# Patient Record
Sex: FEMALE | Race: WHITE | NOT HISPANIC OR LATINO | URBAN - METROPOLITAN AREA
[De-identification: names, ages, dates, MRNs, and addresses within clinical notes are randomized per-mention and may not be internally consistent; named-entity substitution may affect disease eponyms.]

---

## 2020-07-31 ENCOUNTER — OFFICE VISIT (OUTPATIENT)
Dept: URBAN - METROPOLITAN AREA CLINIC 25 | Facility: CLINIC | Age: 85
End: 2020-07-31
Payer: MEDICARE

## 2020-07-31 ENCOUNTER — DASHBOARD ENCOUNTERS (OUTPATIENT)
Age: 85
End: 2020-07-31

## 2020-07-31 DIAGNOSIS — R15.1 FECAL SMEARING: ICD-10-CM

## 2020-07-31 DIAGNOSIS — R10.30 LOWER ABDOMINAL PAIN: ICD-10-CM

## 2020-07-31 DIAGNOSIS — Z79.02 ANTIPLATELET OR ANTITHROMBOTIC LONG-TERM USE: ICD-10-CM

## 2020-07-31 DIAGNOSIS — R13.10 ESOPHAGEAL DYSPHAGIA: ICD-10-CM

## 2020-07-31 DIAGNOSIS — K62.89 ANAL SPHINCTER INCOMPETENCE: ICD-10-CM

## 2020-07-31 PROBLEM — 305058001: Status: ACTIVE | Noted: 2020-07-31

## 2020-07-31 PROBLEM — 225593006: Status: ACTIVE | Noted: 2020-07-31

## 2020-07-31 PROBLEM — 249647002: Status: ACTIVE | Noted: 2020-07-31

## 2020-07-31 PROBLEM — 40890009: Status: ACTIVE | Noted: 2020-07-31

## 2020-07-31 PROBLEM — 54586004: Status: ACTIVE | Noted: 2020-07-31

## 2020-07-31 PROCEDURE — 99213 OFFICE O/P EST LOW 20 MIN: CPT | Performed by: INTERNAL MEDICINE

## 2020-07-31 PROCEDURE — G8420 CALC BMI NORM PARAMETERS: HCPCS | Performed by: INTERNAL MEDICINE

## 2020-07-31 PROCEDURE — G8427 DOCREV CUR MEDS BY ELIG CLIN: HCPCS | Performed by: INTERNAL MEDICINE

## 2020-07-31 PROCEDURE — G9903 PT SCRN TBCO ID AS NON USER: HCPCS | Performed by: INTERNAL MEDICINE

## 2020-07-31 RX ORDER — DORZOLAMIDE HCL/PF 2 %
DROPS OPHTHALMIC (EYE)
Qty: 0 | Refills: 0 | Status: ACTIVE | COMMUNITY
Start: 1900-01-01

## 2020-07-31 RX ORDER — ASPIRIN 81 MG/1
TABLET, COATED ORAL
Qty: 0 | Refills: 0 | Status: ACTIVE | COMMUNITY
Start: 1900-01-01

## 2020-07-31 RX ORDER — FOLIC ACID 1 MG/1
TAKE 1 TABLET (1 MG) BY ORAL ROUTE ONCE DAILY TABLET ORAL 1
Qty: 0 | Refills: 0 | Status: ACTIVE | COMMUNITY
Start: 1900-01-01

## 2020-07-31 RX ORDER — LATANOPROST/PF 0.005 %
DROPS OPHTHALMIC (EYE)
Qty: 0 | Refills: 0 | Status: ACTIVE | COMMUNITY
Start: 1900-01-01

## 2020-07-31 RX ORDER — RANOLAZINE 500 MG/1
TAKE 1 TABLET (500 MG) BY ORAL ROUTE 2 TIMES PER DAY TABLET, FILM COATED, EXTENDED RELEASE ORAL 2
Qty: 0 | Refills: 0 | Status: ACTIVE | COMMUNITY
Start: 1900-01-01

## 2020-07-31 RX ORDER — METOPROLOL TARTRATE 50 MG/1
TABLET ORAL
Qty: 0 | Refills: 0 | Status: ACTIVE | COMMUNITY
Start: 1900-01-01

## 2020-07-31 RX ORDER — SERTRALINE 100 MG/1
TAKE 1 TABLET BY ORAL ROUTE 2 TIMES A DAY TABLET, FILM COATED ORAL 2
Qty: 0 | Refills: 0 | Status: ACTIVE | COMMUNITY
Start: 1900-01-01

## 2020-07-31 RX ORDER — PANTOPRAZOLE SODIUM 20 MG/1
1 TABLET TABLET, DELAYED RELEASE ORAL ONCE A DAY
Qty: 90 | Refills: 2 | OUTPATIENT
Start: 2020-07-31

## 2020-07-31 RX ORDER — CLOPIDOGREL 75 MG/1
TAKE 1 TABLET (75 MG) BY ORAL ROUTE ONCE DAILY TABLET ORAL 1
Qty: 0 | Refills: 0 | Status: ACTIVE | COMMUNITY
Start: 1900-01-01

## 2020-07-31 NOTE — HPI-TODAY'S VISIT:
July 2020 : remains on plavix. symptomatic dysphagia. no gerd. using metamucil which helps with fecal smearing.

## 2020-07-31 NOTE — HPI-OTHER HISTORIES
March 2020 -  since pt had PCI in july 2019- plavix was recommended atleast for 1 yr ( until july 2020) without interruption. fecal leakage is better since on fiber. continue to be on PPI. intermittent dysphagia continues. mostly food related.    Sept 2019 -  Remains on metamucil which has helped with fecal incontinence. Continues to have intermittent dysphagia. Mild heartburn. On protonix 40 mg daily. Patient is now willing to have EGD.   June 2019 -  Fecal incontinence significantly improved with fiber use. Abdominal pain has improved. Patient did experience intermittent dysphagia. Dysphagia mostly occurs with chicken and potatoes. No heartburn but remains on PPI.  CT a/p with contrast June 2019: No definite acute abdominopelvic process. Degenerative disc disease with age-indeterminate compression deformites of T11 and L2. Correlate with any backpain. There is retained barium in the left colon and rectum. No bowel obstruction. Upper GI series May 2019: small sliding type hiatal hernia. Otherwise normal upper GI series. Patient was unable to swallow the 13 mm barium pill , although no esophageal stricture was visualized.  May 2019 -  Presently on cirtucel bid with complete resolution in fecal incontinece. Also doing kegal exercises. Denies rectal bleeding. Experiencing vauge lower abdominal pain for several months, sometimes worse on an empty stomach, lasts for a few hours. Described as a dull ache like pain. Episodic dysphagia to both liquids and solids. EGD was done 2 years ago.  April 2019 - chronic fecal incontinence. started 6 years ago. usually follows a bout of constipation. does admit to frequent constipation. last colonoscpy was 2009  - was normal except hemorrhoids. no rectal bleeding. mild abdominal discomfort. CVA as well as multiple cardiac stents - on plavix. Has tried fiber and miralax. wearing depends.

## 2021-04-06 ENCOUNTER — OFFICE VISIT (OUTPATIENT)
Dept: URBAN - METROPOLITAN AREA CLINIC 27 | Facility: CLINIC | Age: 86
End: 2021-04-06

## 2021-04-07 ENCOUNTER — OFFICE VISIT (OUTPATIENT)
Dept: URBAN - METROPOLITAN AREA CLINIC 121 | Facility: CLINIC | Age: 86
End: 2021-04-07

## 2021-04-20 ENCOUNTER — ERX REFILL RESPONSE (OUTPATIENT)
Dept: URBAN - METROPOLITAN AREA CLINIC 25 | Facility: CLINIC | Age: 86
End: 2021-04-20

## 2021-04-20 RX ORDER — PANTOPRAZOLE SODIUM 20 MG/1
1 TABLET TABLET, DELAYED RELEASE ORAL ONCE A DAY
Qty: 90 | Refills: 2

## 2021-05-26 ENCOUNTER — OFFICE VISIT (OUTPATIENT)
Dept: URBAN - METROPOLITAN AREA SURGERY CENTER 7 | Facility: SURGERY CENTER | Age: 86
End: 2021-05-26

## 2022-04-30 ENCOUNTER — TELEPHONE ENCOUNTER (OUTPATIENT)
Dept: URBAN - METROPOLITAN AREA CLINIC 121 | Facility: CLINIC | Age: 87
End: 2022-04-30

## 2022-05-01 ENCOUNTER — TELEPHONE ENCOUNTER (OUTPATIENT)
Dept: URBAN - METROPOLITAN AREA CLINIC 121 | Facility: CLINIC | Age: 87
End: 2022-05-01

## 2022-05-01 RX ORDER — RANOLAZINE 500 MG/1
TABLET, EXTENDED RELEASE ORAL
Status: ACTIVE | COMMUNITY
Start: 2021-04-07

## 2022-05-01 RX ORDER — ALIROCUMAB 150 MG/ML
INJECTION, SOLUTION SUBCUTANEOUS
Status: ACTIVE | COMMUNITY
Start: 2021-04-07

## 2022-05-01 RX ORDER — FOLIC ACID/VIT B COMPLEX AND C 1 MG
TABLET ORAL
Status: ACTIVE | COMMUNITY
Start: 2021-04-07

## 2022-05-01 RX ORDER — NITROGLYCERIN 0.4 MG/1
TABLET SUBLINGUAL
Status: ACTIVE | COMMUNITY
Start: 2021-04-07

## 2022-05-01 RX ORDER — METOPROLOL SUCCINATE 25 MG/1
TABLET, EXTENDED RELEASE ORAL
Status: ACTIVE | COMMUNITY
Start: 2021-04-07

## 2022-05-01 RX ORDER — LATANOPROST/PF 0.005 %
DROPS OPHTHALMIC (EYE)
Status: ACTIVE | COMMUNITY
Start: 2021-04-07

## 2022-05-01 RX ORDER — KRILL/OM-3/DHA/EPA/PHOSPHO/AST 1000-230MG
CAPSULE ORAL
Status: ACTIVE | COMMUNITY
Start: 2021-04-07

## 2022-05-01 RX ORDER — CLOPIDOGREL BISULFATE 75 MG
TABLET ORAL
Status: ACTIVE | COMMUNITY
Start: 2021-04-07

## 2022-05-01 RX ORDER — DORZOLAMIDE HCL/PF 2 %
DROPS OPHTHALMIC (EYE)
Status: ACTIVE | COMMUNITY
Start: 2021-04-07